# Patient Record
Sex: FEMALE | Race: WHITE
[De-identification: names, ages, dates, MRNs, and addresses within clinical notes are randomized per-mention and may not be internally consistent; named-entity substitution may affect disease eponyms.]

---

## 2019-10-28 ENCOUNTER — HOSPITAL ENCOUNTER (EMERGENCY)
Dept: HOSPITAL 9 - MADERS | Age: 79
Discharge: HOME | End: 2019-10-28
Payer: OTHER GOVERNMENT

## 2019-10-28 DIAGNOSIS — I10: ICD-10-CM

## 2019-10-28 DIAGNOSIS — G47.00: ICD-10-CM

## 2019-10-28 DIAGNOSIS — K26.9: Primary | ICD-10-CM

## 2019-10-28 DIAGNOSIS — I48.91: ICD-10-CM

## 2019-10-28 DIAGNOSIS — E87.6: ICD-10-CM

## 2019-10-28 DIAGNOSIS — K59.00: ICD-10-CM

## 2019-10-28 DIAGNOSIS — E78.5: ICD-10-CM

## 2019-10-28 LAB
ALBUMIN SERPL BCG-MCNC: 3.8 G/DL (ref 3.4–4.8)
ALP SERPL-CCNC: 58 U/L (ref 40–110)
ALT SERPL W P-5'-P-CCNC: 16 U/L (ref 8–55)
AMYLASE SERPL-CCNC: 77 U/L (ref 20–160)
ANION GAP SERPL CALC-SCNC: 13 MMOL/L (ref 10–20)
AST SERPL-CCNC: 19 U/L (ref 5–34)
BACTERIA UR QL AUTO: (no result) HPF
BASOPHILS # BLD AUTO: 0.1 THOU/UL (ref 0–0.2)
BASOPHILS NFR BLD AUTO: 0.7 % (ref 0–1)
BILIRUB SERPL-MCNC: 0.7 MG/DL (ref 0.2–1.2)
BUN SERPL-MCNC: 13 MG/DL (ref 9.8–20.1)
CALCIUM SERPL-MCNC: 9.2 MG/DL (ref 7.8–10.44)
CHLORIDE SERPL-SCNC: 96 MMOL/L (ref 98–107)
CO2 SERPL-SCNC: 27 MMOL/L (ref 23–31)
CREAT CL PREDICTED SERPL C-G-VRATE: 0 ML/MIN (ref 70–130)
EOSINOPHIL # BLD AUTO: 0.1 THOU/UL (ref 0–0.7)
EOSINOPHIL NFR BLD AUTO: 0.8 % (ref 0–10)
GLOBULIN SER CALC-MCNC: 3.4 G/DL (ref 2.4–3.5)
GLUCOSE SERPL-MCNC: 109 MG/DL (ref 83–110)
HGB BLD-MCNC: 15.4 G/DL (ref 12–16)
LIPASE SERPL-CCNC: 109 U/L (ref 8–78)
LYMPHOCYTES # BLD AUTO: 2.2 THOU/UL (ref 1.2–3.4)
LYMPHOCYTES NFR BLD AUTO: 22.8 % (ref 21–51)
MCH RBC QN AUTO: 30.6 PG (ref 27–31)
MCV RBC AUTO: 95.7 FL (ref 78–98)
MONOCYTES # BLD AUTO: 0.9 THOU/UL (ref 0.11–0.59)
MONOCYTES NFR BLD AUTO: 9.7 % (ref 0–10)
NEUTROPHILS # BLD AUTO: 6.3 THOU/UL (ref 1.4–6.5)
NEUTROPHILS NFR BLD AUTO: 66 % (ref 42–75)
PLATELET # BLD AUTO: 215 THOU/UL (ref 130–400)
POTASSIUM SERPL-SCNC: 3.1 MMOL/L (ref 3.5–5.1)
RBC # BLD AUTO: 5.04 MILL/UL (ref 4.2–5.4)
RBC UR QL AUTO: (no result) HPF (ref 0–3)
SODIUM SERPL-SCNC: 133 MMOL/L (ref 136–145)
WBC # BLD AUTO: 9.5 THOU/UL (ref 4.8–10.8)
WBC UR QL AUTO: (no result) HPF (ref 0–3)

## 2019-10-28 PROCEDURE — C9113 INJ PANTOPRAZOLE SODIUM, VIA: HCPCS

## 2019-10-28 PROCEDURE — 85025 COMPLETE CBC W/AUTO DIFF WBC: CPT

## 2019-10-28 PROCEDURE — 81003 URINALYSIS AUTO W/O SCOPE: CPT

## 2019-10-28 PROCEDURE — 81015 MICROSCOPIC EXAM OF URINE: CPT

## 2019-10-28 PROCEDURE — 83690 ASSAY OF LIPASE: CPT

## 2019-10-28 PROCEDURE — 84484 ASSAY OF TROPONIN QUANT: CPT

## 2019-10-28 PROCEDURE — 96374 THER/PROPH/DIAG INJ IV PUSH: CPT

## 2019-10-28 PROCEDURE — 82150 ASSAY OF AMYLASE: CPT

## 2019-10-28 PROCEDURE — 74177 CT ABD & PELVIS W/CONTRAST: CPT

## 2019-10-28 PROCEDURE — 80053 COMPREHEN METABOLIC PANEL: CPT

## 2019-10-28 PROCEDURE — 96361 HYDRATE IV INFUSION ADD-ON: CPT

## 2019-10-28 PROCEDURE — 93005 ELECTROCARDIOGRAM TRACING: CPT

## 2019-10-28 NOTE — CT
CT OF THE ABDOMEN AND PELVIS WITH IV CONTRAST



INDICATION: 78-year-old female with history of appendectomy, hysterectomy and bladder cancer with rig
ht upper quadrant abdominal pain and constipation



COMPARISON: None



FINDINGS:



ABDOMEN:



Lung bases: Bibasilar atelectasis



Liver: No focal lesion.



Gallbladder:  Normal appearing.



Pancreas: Normal.



Adrenal glands: Normal.



Spleen: Normal.



Kidneys and ureters: Small subcentimeter cyst involves supra pole right kidney. There are bilateral p
eripelvic cyst. No mona hydronephrosis is evident.



Vasculature: There is mild-to-moderate calcification of the dominant aorta.



Lymph nodes:No lymphadenopathy.



Free fluid in abdomen:No free fluid is evident.



PELVIS:



Small and large bowel: There are scattered colonic diverticula. There is a small hiatal hernia.



Appendix:Surgically absent



Bladder: Normal.



Rectal and perirectal soft tissues:Normal.



Reproductive structures: Surgically absent



Free fluid in pelvis: No free fluid is evident.



Lymphadenopathy pelvis: No lymphadenopathy is evident.



Osseous structures: Grade 1 anterolisthesis of L5 on S1 with vacuum disc phenomenon at the L5-S1 disc
. No acute fracture or subluxation demonstrated.  There is scattered degenerative and

osteoarthritic changes.



Soft tissues:Normal.



IMPRESSION:

1. No acute abnormality.



Reported By: Riki Viera 

Electronically Signed:  10/28/2019 10:12 PM

## 2019-10-30 ENCOUNTER — HOSPITAL ENCOUNTER (EMERGENCY)
Dept: HOSPITAL 92 - ERS | Age: 79
LOS: 1 days | Discharge: ANOTHER HOSPITAL | End: 2019-10-31
Payer: OTHER GOVERNMENT

## 2019-10-30 DIAGNOSIS — I10: ICD-10-CM

## 2019-10-30 DIAGNOSIS — Z79.891: ICD-10-CM

## 2019-10-30 DIAGNOSIS — E78.5: ICD-10-CM

## 2019-10-30 DIAGNOSIS — I48.91: ICD-10-CM

## 2019-10-30 DIAGNOSIS — G47.00: ICD-10-CM

## 2019-10-30 DIAGNOSIS — R07.82: Primary | ICD-10-CM

## 2019-10-30 DIAGNOSIS — Z79.899: ICD-10-CM

## 2019-10-30 LAB
ALBUMIN SERPL BCG-MCNC: 3.8 G/DL (ref 3.4–4.8)
ALP SERPL-CCNC: 58 U/L (ref 40–110)
ALT SERPL W P-5'-P-CCNC: 15 U/L (ref 8–55)
ANION GAP SERPL CALC-SCNC: 10 MMOL/L (ref 10–20)
AST SERPL-CCNC: 20 U/L (ref 5–34)
BASOPHILS # BLD AUTO: 0.1 THOU/UL (ref 0–0.2)
BASOPHILS NFR BLD AUTO: 0.5 % (ref 0–1)
BILIRUB SERPL-MCNC: 0.7 MG/DL (ref 0.2–1.2)
BUN SERPL-MCNC: 17 MG/DL (ref 9.8–20.1)
CALCIUM SERPL-MCNC: 8.7 MG/DL (ref 7.8–10.44)
CHLORIDE SERPL-SCNC: 98 MMOL/L (ref 98–107)
CO2 SERPL-SCNC: 27 MMOL/L (ref 23–31)
CREAT CL PREDICTED SERPL C-G-VRATE: 0 ML/MIN (ref 70–130)
EOSINOPHIL # BLD AUTO: 0 THOU/UL (ref 0–0.7)
EOSINOPHIL NFR BLD AUTO: 0.4 % (ref 0–10)
GLOBULIN SER CALC-MCNC: 3.2 G/DL (ref 2.4–3.5)
GLUCOSE SERPL-MCNC: 119 MG/DL (ref 83–110)
HGB BLD-MCNC: 14.4 G/DL (ref 12–16)
LYMPHOCYTES # BLD: 1.6 THOU/UL (ref 1.2–3.4)
LYMPHOCYTES NFR BLD AUTO: 15.1 % (ref 21–51)
MCH RBC QN AUTO: 33 PG (ref 27–31)
MCV RBC AUTO: 94.8 FL (ref 78–98)
MONOCYTES # BLD AUTO: 1 THOU/UL (ref 0.11–0.59)
MONOCYTES NFR BLD AUTO: 9.3 % (ref 0–10)
NEUTROPHILS # BLD AUTO: 7.8 THOU/UL (ref 1.4–6.5)
NEUTROPHILS NFR BLD AUTO: 74.7 % (ref 42–75)
PLATELET # BLD AUTO: 236 THOU/UL (ref 130–400)
POTASSIUM SERPL-SCNC: 3.4 MMOL/L (ref 3.5–5.1)
RBC # BLD AUTO: 4.35 MILL/UL (ref 4.2–5.4)
SODIUM SERPL-SCNC: 132 MMOL/L (ref 136–145)
WBC # BLD AUTO: 10.5 THOU/UL (ref 4.8–10.8)

## 2019-10-30 PROCEDURE — 36415 COLL VENOUS BLD VENIPUNCTURE: CPT

## 2019-10-30 PROCEDURE — 80053 COMPREHEN METABOLIC PANEL: CPT

## 2019-10-30 PROCEDURE — 84484 ASSAY OF TROPONIN QUANT: CPT

## 2019-10-30 PROCEDURE — 71045 X-RAY EXAM CHEST 1 VIEW: CPT

## 2019-10-30 PROCEDURE — 93005 ELECTROCARDIOGRAM TRACING: CPT

## 2019-10-30 PROCEDURE — 85025 COMPLETE CBC W/AUTO DIFF WBC: CPT

## 2019-10-30 NOTE — RAD
Chest AP view



INDICATION: Chest pain



COMPARISON: None



FINDINGS:



Lungs:There are scattered areas of peripheral bronchiectasis and fibrotic change. There is elevation 
of the right hemidiaphragm. No consolidation is evident.



Cardiac silhouette:There is mild cardiomegaly



Pulmonary vasculature:Normal



Pleural spaces:No pleural effusion or pneumothorax is demonstrated.



Upper abdomen:No abnormality seen.



Osseous structures: No acute osseous abnormality.



Additional findings:None.



IMPRESSION: Mild cardiomegaly without evidence of cardiac decompensation. Areas of peripheral bronchi
ectasis and scattered interstitial fibrotic change.



Reported By: Riki Viera 

Electronically Signed:  10/30/2019 10:11 PM